# Patient Record
Sex: MALE | Race: WHITE
[De-identification: names, ages, dates, MRNs, and addresses within clinical notes are randomized per-mention and may not be internally consistent; named-entity substitution may affect disease eponyms.]

---

## 2019-06-07 ENCOUNTER — HOSPITAL ENCOUNTER (OUTPATIENT)
Dept: HOSPITAL 39 - LAB.O | Age: 64
End: 2019-06-07
Attending: NURSE PRACTITIONER
Payer: COMMERCIAL

## 2019-06-07 DIAGNOSIS — L03.119: Primary | ICD-10-CM

## 2019-06-11 ENCOUNTER — HOSPITAL ENCOUNTER (OUTPATIENT)
Dept: HOSPITAL 39 - LAB.O | Age: 64
End: 2019-06-11
Attending: SURGERY
Payer: COMMERCIAL

## 2019-06-11 DIAGNOSIS — L02.415: Primary | ICD-10-CM

## 2020-04-12 ENCOUNTER — HOSPITAL ENCOUNTER (OUTPATIENT)
Dept: HOSPITAL 39 - GOCC | Age: 65
End: 2020-04-12
Attending: INTERNAL MEDICINE
Payer: SELF-PAY

## 2020-04-12 DIAGNOSIS — J96.90: ICD-10-CM

## 2020-04-12 DIAGNOSIS — E11.8: ICD-10-CM

## 2020-04-12 DIAGNOSIS — Z74.9: ICD-10-CM

## 2020-04-12 DIAGNOSIS — I63.9: Primary | ICD-10-CM

## 2020-04-12 DIAGNOSIS — E46: ICD-10-CM

## 2020-04-17 ENCOUNTER — HOSPITAL ENCOUNTER (INPATIENT)
Dept: HOSPITAL 39 - ER | Age: 65
LOS: 3 days | Discharge: NURSING, REHAB OR LONG-TERM CARE | DRG: 871 | End: 2020-04-20
Attending: NURSE PRACTITIONER | Admitting: NURSE PRACTITIONER
Payer: SELF-PAY

## 2020-04-17 DIAGNOSIS — L89.153: ICD-10-CM

## 2020-04-17 DIAGNOSIS — R41.82: ICD-10-CM

## 2020-04-17 DIAGNOSIS — J18.9: ICD-10-CM

## 2020-04-17 DIAGNOSIS — I69.998: ICD-10-CM

## 2020-04-17 DIAGNOSIS — E11.649: ICD-10-CM

## 2020-04-17 DIAGNOSIS — G40.909: ICD-10-CM

## 2020-04-17 DIAGNOSIS — J02.0: ICD-10-CM

## 2020-04-17 DIAGNOSIS — Z79.4: ICD-10-CM

## 2020-04-17 DIAGNOSIS — K59.09: ICD-10-CM

## 2020-04-17 DIAGNOSIS — A41.89: Primary | ICD-10-CM

## 2020-04-17 DIAGNOSIS — Z74.01: ICD-10-CM

## 2020-04-17 DIAGNOSIS — Z79.82: ICD-10-CM

## 2020-04-17 DIAGNOSIS — Z20.828: ICD-10-CM

## 2020-04-17 DIAGNOSIS — Z96.0: ICD-10-CM

## 2020-04-17 DIAGNOSIS — Z88.6: ICD-10-CM

## 2020-04-17 DIAGNOSIS — I10: ICD-10-CM
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
## 2020-04-17 DIAGNOSIS — Z93.1: ICD-10-CM

## 2020-04-17 DIAGNOSIS — M62.82: ICD-10-CM

## 2020-04-17 DIAGNOSIS — Z79.899: ICD-10-CM

## 2020-04-17 PROCEDURE — BW211ZZ COMPUTERIZED TOMOGRAPHY (CT SCAN) OF ABDOMEN AND PELVIS USING LOW OSMOLAR CONTRAST: ICD-10-PCS

## 2020-04-17 RX ADMIN — MAGNESIUM HYDROXIDE SCH ML: 400 SUSPENSION ORAL at 20:56

## 2020-04-17 RX ADMIN — ENOXAPARIN SODIUM SCH MG: 40 INJECTION, SOLUTION INTRAVENOUS; SUBCUTANEOUS at 20:56

## 2020-04-17 RX ADMIN — MELATONIN TAB 3 MG SCH MG: 3 TAB at 20:57

## 2020-04-17 RX ADMIN — MAGNESIUM HYDROXIDE SCH ML: 400 SUSPENSION ORAL at 23:07

## 2020-04-17 RX ADMIN — Medication SCH ML: at 21:00

## 2020-04-17 RX ADMIN — INSULIN DETEMIR SCH UNITS: 100 INJECTION, SOLUTION SUBCUTANEOUS at 20:57

## 2020-04-17 RX ADMIN — PIPERACILLIN SODIUM AND TAZOBACTAM SODIUM SCH MLS/HR: 3; .375 INJECTION, POWDER, LYOPHILIZED, FOR SOLUTION INTRAVENOUS at 23:07

## 2020-04-17 RX ADMIN — ALBUTEROL SULFATE SCH PUFF: 90 AEROSOL, METERED RESPIRATORY (INHALATION) at 20:54

## 2020-04-17 RX ADMIN — INSULIN LISPRO SCH: 100 INJECTION, SOLUTION INTRAVENOUS; SUBCUTANEOUS at 21:41

## 2020-04-17 NOTE — CT
EXAM DESCRIPTION:  CT ABDOMEN AND PELVIS WITH CONTRAST



CLINICAL HISTORY: leukocytosis, alk phos elevation, AMS



COMPARISON: None Available.



TECHNIQUE: CT of the abdomen and pelvis are performed after IV

contrast administration. No oral contrast was given. Multiplanar

reconstructions were obtained.



FINDINGS: 



Pleural and subpleural airspace consolidation is present within

the left lower lobe and to a lesser extent within the lingula and

right lower lobe consistent with pneumonia. Imaging features are

atypical or uncommonly reported for (COVID-19 or viral)

pneumonia. There is lack of groundglass opacities. [PneAty]. 



The liver is normal in contour and enhancement. The gallbladder

is unremarkable without calcified gallstone. No biliary ductal

dilatation.



The adrenals and kidneys enhance normally. No hydronephrosis or

nephrolithiasis. A percutaneous gastrostomy tube terminates

within the gastric body.

The spleen is normal in size with scattered splenic

calcifications. Scattered pancreas parenchymal calcifications.



Moderate constipation with stool noted throughout the cecum,

colon, and rectum. There is fecal expansion of the rectum

(measuring 9 cm in diameter). No mechanical bowel obstruction.

The appendix is not clearly visualized. 



There is no lymphadenopathy, inflammation, or free fluid

observed. No free air. Mild intrapelvic atherosclerosis.



A Madden catheter decompresses bladder. Small prostatic

calcification.



No acute osseous abnormality. Transitional lumbosacral anatomy.





IMPRESSION: 



1.  Left lung base pleural and subpleural airspace infiltrates

concerning for pneumonia. More mild infiltrates within the

lingula and right lung base.

2.  Constipation. Large fecal load within the rectum to be seen

with fecal impaction.

3.  No intra-abdominal or pelvic organized fluid collection or

abscess.





This exam was performed according to our departmental

dose-optimization program, which includes automated exposure

control, adjustment of the mA and/or kV according to patient size

and/or use of iterative reconstruction technique.



Electronically signed by:  Juventino Cao DO  4/17/2020 2:40 PM CDT

Workstation: [id number]

## 2020-04-17 NOTE — ED.PDOC
History of Present Illness





- General


Time Seen by Provider: 04/17/20 12:52


Source: EMS, nursing home records





- History of Present Illness


Initial Comments: 





66 yo male with PMH of stroke, hx of trach dependency s/p reversal, g-tube 

dependent, chronic indwelling forbes who is bib EMS from [facility] for cc of

possible seizure activity.  Per EMS no seizure activity was reported on scene, 

just were told that the patient had an elevated heart rate and they wanted him 

evaluated.  Pt noted to be tachycardic en route HR 120s but afebrile, remainder 

of vitals stable.  Reported he is otherwise at his baseline mental status.  Pt 

at baseline seems to be awake and alert, minimal verbal communication.  Bed-

bound, fully dependent on others for all ADL's.  He denies any complaints to me 

and denies any pain, dyspnea, or other discomforts currently.





Allergies/Adverse Reactions: 


Allergies





Meperidine [From Demerol HCl] Allergy (Verified 04/17/20 13:39)


   





Home Medications: 


Ambulatory Orders





Ascorbic Acid [Vitamin C] 250 mg PO DAILY 04/17/20 


Aspirin [(None)] 325 mg PO DAILY 04/17/20 


Cholecalciferol [Vitamin D-3] 5,000 unit PO DAILY 04/17/20 


Divalproex Sodium [Depakote] 250 mg PO BID 04/17/20 


Famotidine [Pepcid Tab] 20 mg PO BID 04/17/20 


Folic Acid [FA-8] 0.8 mg PO DAILY 04/17/20 


Insulin Glargine [Lantus Solostar] 20 unit SC BID 04/17/20 


Metformin HCl [Fortamet] 500 mg PO BID 04/17/20 


Quetiapine Fumarate [Seroquel] 75 mg PO BID 04/17/20 


RX: Clonazepam 0.5 mg PO BID 04/17/20 


RX: Melatonin 3 mg PO BEDTIME 04/17/20 


RX: Tramadol HCl 50 mg PO Q8H PRN 04/17/20 


RX: Zinc Sulfate 220 mg PO DAILY 04/17/20 


Silodosin [Rapaflo] 8 mg PO DAILY 04/17/20 











Review of Systems





- Review of Systems


Review of Systems: 





04/17/20 13:09


as per HPI





All other Systems: Reviewed and Negative





Past Medical History (General)





- Patient Medical History


Hx Stroke: Yes


Hx Hypertension: Yes


Hx Diabetes: Yes





- Vaccination History


Hx Tetanus, Diphtheria Vaccination: Yes - 2013





Family Medical History





- Family History


  ** Mother


Family History: Unknown





Physical Exam





- Physical Exam


General Appearance: Alert, Comfortable, No apparent distress, Unkempt


Eye Exam: bilateral normal


Ears, Nose, Throat: normal ENT inspection, normal pharynx


Neck: non-tender, full range of motion, supple, normal inspection


Respiratory: chest non-tender, no respiratory distress, no accessory muscle use,

 rales - wet rales BL at lower and mid lung fields, rhonchi


Cardiovascular/Chest: no edema, no gallop, no JVD, no murmur, tachycardia


Peripheral Pulses: radial,right: 2+, radial,left: 2+


Gastrointestinal/Abdominal: non tender, soft, no organomegaly


Back Exam: normal inspection, no CVA tenderness, no vertebral tenderness


Extremity: non-tender, normal inspection, no pedal edema, no calf tenderness


Neurologic: alert, other - generally weak, strength appears 3/5 throughout all 

extremities but difficult to test given baseline mental status, able to answer 

simple yes/no questions and follow simple commands


Skin Exam: warm/dry, pallor





Progress





- Progress


Progress: 





04/17/20 13:11


AMS


-consider: infection/sepsis, ?seizure, ?CVA, CHF, PNA, ACS, other, metabolic 

derangement, other


-obtain cardiac work-up, CT head, sepsis work-up


-place PIV, 1 L NS bolus





04/17/20 16:37


-CT head showed no acute processes


-Labs revealed WBC 15,000 w/ left shift & no bands, lactate 2.3 -> 2.1 

(improving with IV fluids).  Abnormal LFT's and alk phos so CT A/P obtained 

which revealed no acute intraabdominal processes but a left lower lobe lung 

infiltrate noted concerning for PNA.  Pt also noted to be strep positive.  CPK 

>900 -> Rhabdo.


-Spoke with pt's wife at bedside who states pt did have a seizure several years 

ago while in the hospital after his stroke and is on Depakote.  No further 

events until today - today's event witnessed by nurse at NH but not by wife.  No

 further seizure activity in ED and pt improving - wife reports nearly back to 

baseline mental status.


-Discussed dx's of CAP, strep, ?UTI, sepsis, rhabdo, ?seizure and need for 

admission.  Blood cx's drawn and begun on Zosyn for broad-spectrum coverage.


-Spoke with Judi Adolph who accepts for admission.  After some discussion, will 

also send COVID testing given high-risk pt.  He has not had a fever today.  

However, initial O2 sat here was 88% on room air which improved to >95% with 2 L

 by NC.





Keyon Solis MD


Billing #[id number]








                                        





04/17/20 12:52


Sodium Chloride 0.9% (Flush) [Saline Flush Syringe]   10 ml IV PRN PRN 


Pulse Oximetry Assessment DAILY 





04/17/20 12:53


URINALYSIS Stat 





04/17/20 13:00


EKG STAT 





04/17/20 13:49


Hold Metformin x 48Hrs [id number] 





04/17/20 14:20


BLOOD CULTURE Stat 





04/17/20 15:46


Sodium Chloride 0.9% 1000ML [Ns 1000 ml] 1,000 ml IVS ONCE 





04/17/20 16:23


SARS-COV2 PCR HIGH RISK [facility] Stat 





04/18/20 09:00


Pulse Ox Daily 








                         Laboratory Results - last 24 hr











  04/17/20 04/17/20 04/17/20





  13:03 13:18 13:18


 


WBC   15.7 H 


 


RBC   3.46 L 


 


Hgb   9.9 L 


 


Hct   29.8 L 


 


MCV   86.0 


 


MCH   28.5 


 


MCHC   33.2 


 


RDW   13.9 


 


Plt Count   399 


 


MPV   7.3 L 


 


Absolute Neuts (auto)   13.40 H 


 


Absolute Lymphs (auto)   1.20 


 


Absolute Monos (auto)   1.00 H 


 


Absolute Eos (auto)   0.00 


 


Absolute Basos (auto)   0.00 


 


Neutrophils %   85.4 H 


 


Lymphocytes %   7.5 L 


 


Monocytes %   6.6 


 


Eosinophils %   0.2 L 


 


Basophils %   0.3 


 


Sodium    136


 


Potassium    4.4


 


Chloride    103


 


Carbon Dioxide    22


 


Anion Gap    15.4


 


BUN    50 H


 


Creatinine    1.03


 


BUN/Creatinine Ratio    48.5 H


 


POC Glucose  165 H  


 


Random Glucose    151 H


 


Serum Osmolality    288.2


 


Lactic Acid   


 


Calcium    8.6


 


Total Bilirubin    0.7


 


AST    76 H


 


ALT    160 H


 


Alkaline Phosphatase    262 H


 


Creatine Kinase   


 


Troponin I   


 


B-Natriuretic Peptide    118.0 H


 


Serum Total Protein    7.1


 


Albumin    2.2 L


 


Globulin    4.9 H


 


Albumin/Globulin Ratio    0.4 L


 


Group A Strep Rapid   














  04/17/20 04/17/20 04/17/20





  13:18 13:18 13:18


 


WBC   


 


RBC   


 


Hgb   


 


Hct   


 


MCV   


 


MCH   


 


MCHC   


 


RDW   


 


Plt Count   


 


MPV   


 


Absolute Neuts (auto)   


 


Absolute Lymphs (auto)   


 


Absolute Monos (auto)   


 


Absolute Eos (auto)   


 


Absolute Basos (auto)   


 


Neutrophils %   


 


Lymphocytes %   


 


Monocytes %   


 


Eosinophils %   


 


Basophils %   


 


Sodium   


 


Potassium   


 


Chloride   


 


Carbon Dioxide   


 


Anion Gap   


 


BUN   


 


Creatinine   


 


BUN/Creatinine Ratio   


 


POC Glucose   


 


Random Glucose   


 


Serum Osmolality   


 


Lactic Acid  2.3 H  


 


Calcium   


 


Total Bilirubin   


 


AST   


 


ALT   


 


Alkaline Phosphatase   


 


Creatine Kinase    915 H*


 


Troponin I   0.03 


 


B-Natriuretic Peptide   


 


Serum Total Protein   


 


Albumin   


 


Globulin   


 


Albumin/Globulin Ratio   


 


Group A Strep Rapid   














  04/17/20 04/17/20





  13:40 14:05


 


WBC  


 


RBC  


 


Hgb  


 


Hct  


 


MCV  


 


MCH  


 


MCHC  


 


RDW  


 


Plt Count  


 


MPV  


 


Absolute Neuts (auto)  


 


Absolute Lymphs (auto)  


 


Absolute Monos (auto)  


 


Absolute Eos (auto)  


 


Absolute Basos (auto)  


 


Neutrophils %  


 


Lymphocytes %  


 


Monocytes %  


 


Eosinophils %  


 


Basophils %  


 


Sodium  


 


Potassium  


 


Chloride  


 


Carbon Dioxide  


 


Anion Gap  


 


BUN  


 


Creatinine  


 


BUN/Creatinine Ratio  


 


POC Glucose  


 


Random Glucose  


 


Serum Osmolality  


 


Lactic Acid   2.1


 


Calcium  


 


Total Bilirubin  


 


AST  


 


ALT  


 


Alkaline Phosphatase  


 


Creatine Kinase  


 


Troponin I  


 


B-Natriuretic Peptide  


 


Serum Total Protein  


 


Albumin  


 


Globulin  


 


Albumin/Globulin Ratio  


 


Group A Strep Rapid  Positive 














- EKG/XRAY/CT


EKG: Sinus - sinus tach, , no ST elevs or q waves, axis and intervals 

normal, shaky baseline given pt's tremors, no prior EKG for comparison


XRAY: chest - no acute processes per my read





Departure





- Departure


Clinical Impression: 


 Community acquired pneumonia, Strep pharyngitis, Sepsis, Seizure, 

Rhabdomyolysis





Time of Disposition: 16:36


Disposition: Admit Patient


Condition: Fair


Home Medications: 


Ambulatory Orders





Ascorbic Acid [Vitamin C] 250 mg PO DAILY 04/17/20 


Aspirin [(None)] 325 mg PO DAILY 04/17/20 


Cholecalciferol [Vitamin D-3] 5,000 unit PO DAILY 04/17/20 


Divalproex Sodium [Depakote] 250 mg PO BID 04/17/20 


Famotidine [Pepcid Tab] 20 mg PO BID 04/17/20 


Folic Acid [FA-8] 0.8 mg PO DAILY 04/17/20 


Insulin Glargine [Lantus Solostar] 20 unit SC BID 04/17/20 


Metformin HCl [Fortamet] 500 mg PO BID 04/17/20 


Quetiapine Fumarate [Seroquel] 75 mg PO BID 04/17/20 


RX: Clonazepam 0.5 mg PO BID 04/17/20 


RX: Melatonin 3 mg PO BEDTIME 04/17/20 


RX: Tramadol HCl 50 mg PO Q8H PRN 04/17/20 


RX: Zinc Sulfate 220 mg PO DAILY 04/17/20 


Silodosin [Rapaflo] 8 mg PO DAILY 04/17/20 











Decision To Admit





- Decistion To Admit


Decision to Admit Reason: Admit from ER


Decision to Admit Date: 04/17/20


Decision to Admit Time: 16:37

## 2020-04-17 NOTE — RAD
EXAM DESCRIPTION:

Chest,1 View



CLINICAL HISTORY:

65 years Male, tachycardia, altered mental status



COMPARISON:

None.



TECHNIQUE:

AP portable chest.



FINDINGS:

Fair expansion of the lungs is evident without consolidation,

layering effusion, or large mass. Modest eventration of the right

hemidiaphragm and elevation of the lung base is noted without

significant inflammatory changes.

Heart size and vascularity appear normal for AP technique and

degree of inspiration.

No gross bony, hilar, or mediastinal abnormalities are noted. 



IMPRESSION:

Normal chest, one view



Electronically signed by:  Brad Cummins MD  4/17/2020 1:14 PM

CDT Workstation: [id number]

## 2020-04-17 NOTE — CT
EXAM DESCRIPTION: Head



CLINICAL HISTORY: altered mental status, hx of stroke



COMPARISON: None available



TECHNIQUE: Non contrast cranial CT with multiplanar

reconstructions.



FINDINGS: 



No acute intracranial hemorrhage, transcortical infarct, mass or

mass effect. No intra or extra-axial fluid collection. No focal

edema or midline shift. There is moderate mineralization within

the bilateral basal ganglia and bilateral cerebellar white

matter/dentate nuclei, nonspecific. Mild central cerebral volume

loss. No hydrocephalus.   



Scattered chronic appearing infarcts within the bilateral centrum

semiovale and corona radiata in addition to chronic infarct/focal

encephalomalacia within the left inferior temporal lobe. Mild

periventricular and deep white matter hypodensities are likely

related to chronic microvascular ischemic changes. 



No displaced calvarial fracture. The visualized paranasal sinuses

and the mastoids are clear. Chronic sclerosis within the

posterior aspect of the right mastoid air cells.





IMPRESSION:



1.  No acute intracranial hemorrhage or transcortical infarct.

MRI is more sensitive for the evaluation of acute/subacute

intracranial ischemia and can be obtained if clinically

warranted.

2.  Scattered chronic infarcts within the bilateral deep white

matter and left temporal lobe.

3.  Nonspecific mineralization within the bilateral basal and

cerebellar dentate nuclei.







This exam was performed according to our departmental

dose-optimization program, which includes automated exposure

control, adjustment of the mA and/or kV according to patient size

and/or use of iterative reconstruction technique.



Electronically signed by:  Juventino Cao DO  4/17/2020 2:04 PM CDT

Workstation: [id number]

## 2020-04-17 NOTE — HP
SUPERVISING PHYSICIAN:   Jeffery Sharma MD



CHIEF COMPLAINT:   Questionable seizure activity and altered mental status.



HISTORY OF PRESENT ILLNESS:   This is a 65 year-old male patient with a past 
medical history of stroke with a history of a trach that was reversed with a G-
tube and a chronic indwelling Madden catheter.  He was brought to the Emergency 
Room from [facility] for complaints of possible seizure activity. 
EMS did not report any seizure activity on the scene but they were just told 
that the patient had an elevated heart rate and there was question of altered 
mental status by his wife as well as they were unsure if he had a seizure.  It 
is to be noted he does have a past history of some seizure disorder and 
presently on Depakote.  He was afebrile but his heart rate was in the 120s.  The
remainder of his vital signs were stable.  His wife did report that he seemed to
have a change in his mental status but by the time he got to the Emergency Room 
he was at his baseline.  He is nonverbal and does not follow commands.  He is 
bedbound and fully dependent on others for  ADLs.  His vital signs in the 
Emergency Room were temperature of 97.5, heart rate 126, blood pressure 124/83, 
respiratory rate 22 to 26, it got up as high as the 30s.  His oxygen saturation 
was 88% on room air and came up to the low 90s on oxygen.  Lab studies were 
done.  His WBCs were 15,700 with a hemoglobin of 9.9, hematocrit 29.8.  He had a
left shift on his differential.  Blood glucose was 165 with electrolytes within 
normal limits.  BUN 50, creatinine 1.05, lactic acid 2.3.  He received some 
fluids.  His AST is 76, , alkaline phosphatase 262.  Creatinine kinase 
915, troponin 0.03.  .  Group A rapid strep was positive.  Blood cultures
were drawn.  Influenza per PCR negative for A and B.  Chest x-ray showed normal 
chest, one-view.  Head CT showed (1) no acute intracranial hemorrhage or 
transcortical infarction.  MRI is more sensitive to the evaluation.  (2) 
Scattered chronic infarcts within the bilateral deep white matter and left 
temporal lobe.  (3)  Nonspecific mineralization within the bilateral basilar and
cerebellar dentate nuclei.   Abdomen and pelvis CT showed: (1) Left lung-based 
pleural and subpleural air-space infiltrates concerning for pneumonia or mild 
infiltrates within the lingula and right lung base.  (2) Constipation, large 
fecal load within the rectum seen with fecal impaction.  (3)  No intraabdominal 
or pelvic organized fluid collection or abscess.  He was given Zosyn in the 
Emergency Room as well as several liters of fluids.  His followup lactic acid 
was 2.1.  I was called for admission for sepsis related to bilateral pneumonia.



PAST MEDICAL HISTORY: 

1.  CVA with residual G-tube, chronic indwelling Madden catheter and 

     tracheostomy that has since been reversed.

2.  Diabetes mellitus.

3.  Hypertension.



PAST SURGICAL HISTORY:

1.  Toe amputation.



CURRENT MEDICATIONS:

1.  Ascorbic acid.

2.  Aspirin.

3.  Vitamin D3.

4.  Pepcid.

5.  Zinc sulfate.

6.  Clonazepam.

7.  Depakote.

8.  Folic acid.

9.  Glargine insulin.

10.  Melatonin.

11.  Metformin.

12.  Seroquel.

13.  Rapaflo.

14.  Tramadol.

.

ALLERGIES:    Demerol.



FAMILY HISTORY:   Unknown.



SOCIAL HISTORY:  There is no history of tobacco, ETOH or illicit drug use.  He 
lives in [facility].  He had a seizure for unknown length of time 
in the past.



REVIEW OF SYSTEMS: 

Unable to obtain due to patient's current cognitive status.



PHYSICAL EXAMINATION: 



VITAL SIGNS:   Temperature 97, heart rate 110, blood pressure 112/76, 
respiratory rate 20, oxygen saturation 91% on 3 liters nasal cannula.



GENERAL:   This is a 65 year-old male patient lying in his hospital bed.  He is 
in no acute distress.



HEENT:   Normocephalic and atraumatic.  Pupils are equal and reactive.  
Oropharynx is clear.



NECK:   Supple without mass.  There is an old tracheostomy scar on his neck.



CHEST:   Diminished throughout.  Chest shows equal rise and fall of the chest 
with inspiration and expiration.



CARDIOVASCULAR:   He has a sinus tachycardia on the cardiac monitor.



ABDOMEN:  Soft, nondistended, non-tender.  Bowel sounds are positive. 



EXTREMITIES:   No cyanosis, clubbing, or edema.



NEUROLOGIC:   He is asleep.  He awakens easily, opens his eyes but he does not 
have any meaningful conversation and does not follow commands at this time.



SKIN:  Warm and dry.



LABORATORY:  Labs and films are as per the history of present illness.



ASSESSMENT: 

!.   Sepsis related to bilateral pneumonia, most likely healthcare associated

      as patient lives in a long-term care facility.  He has an admitting WBC of

     15, 700 with a heart rate of 126, respiratory rate of 22 to 30, oxygen

      saturation 88% on room air. 

2.  Strep positive phalangitis.

3.   High risk for COVID-19 due to comorbidities, his age and being a 

      resident of a long-term care facility.

4.  Altered mental status with return to baseline in the Emergency Room.

5.  Questionable seizures with a history of seizure disorder, presently

     on Depakote.

6.  Diabetes mellitus type 2.

7.  History of CVA with residual long-term effects that have included a

     reverse tracheostomy, a G-tube and an indwelling Madden catheter  

     totally dependent for ADLS. 

8.  Constipation with fecal impaction.



PLAN:   The patient has been admitted to the hospital.  I will continue his 
Zosyn as ordered in the Emergency Room and I have added vancomycin per pharmacy 
protocol.  The pneumonia guidelines have been initiated that include aggressive 
pulmonary hygiene that includes p.r.n. and scheduled albuterol inhaler.  I will 
given him a dose of milk of magnesia tonight and he may need an enema or a GI 
consultation tomorrow.  I have also done a Depakote level and once his tube 
feedings have been initiated, we will order a sliding scale insulin based on his
feedings.  His home medications have been restarted.  Neuro checks have been 
ordered.  A COVID-19 test has been sent off and we will await those results as 
well as follow his cultures as they become available.  Labs and films have been 
ordered for in the morning and we will continue to monitor him closely and 
follow as needed.



#[id number]

[facility]

## 2020-04-18 RX ADMIN — ALBUTEROL SULFATE SCH: 90 AEROSOL, METERED RESPIRATORY (INHALATION) at 08:00

## 2020-04-18 RX ADMIN — INSULIN LISPRO SCH: 100 INJECTION, SOLUTION INTRAVENOUS; SUBCUTANEOUS at 17:30

## 2020-04-18 RX ADMIN — SODIUM CHLORIDE SCH MLS/HR: 9 INJECTION, SOLUTION INTRAVENOUS at 19:54

## 2020-04-18 RX ADMIN — PIPERACILLIN SODIUM AND TAZOBACTAM SODIUM SCH MLS/HR: 3; .375 INJECTION, POWDER, LYOPHILIZED, FOR SOLUTION INTRAVENOUS at 04:30

## 2020-04-18 RX ADMIN — INSULIN LISPRO SCH: 100 INJECTION, SOLUTION INTRAVENOUS; SUBCUTANEOUS at 13:03

## 2020-04-18 RX ADMIN — VANCOMYCIN HYDROCHLORIDE SCH MLS/HR: 500 INJECTION, POWDER, LYOPHILIZED, FOR SOLUTION INTRAVENOUS at 08:44

## 2020-04-18 RX ADMIN — MELATONIN TAB 3 MG SCH MG: 3 TAB at 20:08

## 2020-04-18 RX ADMIN — ENOXAPARIN SODIUM SCH MG: 40 INJECTION, SOLUTION INTRAVENOUS; SUBCUTANEOUS at 20:07

## 2020-04-18 RX ADMIN — DIVALPROEX SODIUM SCH MG: 250 TABLET, DELAYED RELEASE ORAL at 08:44

## 2020-04-18 RX ADMIN — INSULIN LISPRO SCH: 100 INJECTION, SOLUTION INTRAVENOUS; SUBCUTANEOUS at 21:17

## 2020-04-18 RX ADMIN — ALBUTEROL SULFATE SCH PUFF: 90 AEROSOL, METERED RESPIRATORY (INHALATION) at 16:48

## 2020-04-18 RX ADMIN — PANTOPRAZOLE SODIUM SCH MG: 40 INJECTION, POWDER, FOR SOLUTION INTRAVENOUS at 06:44

## 2020-04-18 RX ADMIN — INSULIN LISPRO SCH: 100 INJECTION, SOLUTION INTRAVENOUS; SUBCUTANEOUS at 06:44

## 2020-04-18 RX ADMIN — ALBUTEROL SULFATE SCH PUFF: 90 AEROSOL, METERED RESPIRATORY (INHALATION) at 11:45

## 2020-04-18 RX ADMIN — INSULIN DETEMIR SCH UNITS: 100 INJECTION, SOLUTION SUBCUTANEOUS at 21:18

## 2020-04-18 RX ADMIN — DIVALPROEX SODIUM SCH MG: 250 TABLET, DELAYED RELEASE ORAL at 20:08

## 2020-04-18 RX ADMIN — INSULIN DETEMIR SCH: 100 INJECTION, SOLUTION SUBCUTANEOUS at 08:45

## 2020-04-18 RX ADMIN — FOLIC ACID SCH MG: 1 TABLET ORAL at 08:44

## 2020-04-18 RX ADMIN — Medication SCH ML: at 08:44

## 2020-04-18 RX ADMIN — SODIUM CHLORIDE SCH MLS/HR: 9 INJECTION, SOLUTION INTRAVENOUS at 13:03

## 2020-04-18 RX ADMIN — Medication SCH ML: at 20:08

## 2020-04-18 RX ADMIN — LEVOFLOXACIN SCH MLS/HR: 5 INJECTION, SOLUTION INTRAVENOUS at 13:08

## 2020-04-18 RX ADMIN — VANCOMYCIN HYDROCHLORIDE SCH MLS/HR: 500 INJECTION, POWDER, LYOPHILIZED, FOR SOLUTION INTRAVENOUS at 21:17

## 2020-04-18 RX ADMIN — ALBUTEROL SULFATE SCH PUFF: 90 AEROSOL, METERED RESPIRATORY (INHALATION) at 20:07

## 2020-04-18 NOTE — RAD
: 1955.



Technique: Two portable AP chest x-ray. Comparison: 2020.



Clinical history: Pneumonia.



Heart size: Heart size within normal limits.

Lungs: No new airspace consolidations are seen. Mild chronic

appearing background interstitial opacities are noted.

Pleura: No pleural effusion. No pneumothorax.

Mediastinum and richar: Unremarkable.

Skeletal: Unremarkable.

Support tubings: None.



Impression:

1.  No acute findings in the chest.



Electronically signed by:  Sourav Darby MD  2020 9:46 AM

CDT Workstation: [id number]

## 2020-04-18 NOTE — PN
SUPERVISING PHYSICIAN:    Jeffery Sharma MD



DATE:   04/18/20



SUBJECTIVE: The patient is resting in bed.  He has had a low grade fever of 
100.0 overnight, remains on nasal cannula is showing to be stable.



OBJECTIVE:

VITAL SIGNS:  T-max 100.0 with pulse 96, blood pressure 138/72, respirations 18,
oxygen saturation 97% on 3 liter nasal cannula.

GENERAL:  The patient is resting comfortably, does not appear to be in any 
distress.  He is alert to verbal commands but hall not converse.

CHEST:  Lung sounds remain diminished throughout.

HEART:  Continues to show tachycardia on bedside monitor.

ABDOMEN:  Soft, non-tender, bowel sounds are positive.

EXTREMITIES:  Without edema.

NEUROLOGIC:  He awakens easily but does not have any meaningful conversation but
will follow some basic commands.  He does not show to have any obvious focal 
deficits.

SKIN:  Warm and dry.



LABORATORY:  White count 12,800, hemoglobin down to 8.2, hematocrit 24.0, 
differential does continue to show a left shift.  Chemistries show normal 
electrolytes with BUN 42, creatinine 0.9, calcium 8.2.  Liver functions show 
elevation but improvement, AST down to 50, ALT down to 115.  Alkaline 
phosphatase 209.  Blood sugars remain stable between 69 and 143.



MICROBIOLOGY:  Urine culture pending.  Blood cultures negative at 24 hours.  
Repeat chest x-ray this morning per radiology interpretation shows no air-space 
consolidation with some mild chronic appearing interstitial opacities noted but 
no significant change from previous findings per radiology interpretation.



ASSESSMENT: 

1.   Sepsis due to bilateral pneumonia, healthcare associated with patient being


      a resident of a long-term care facility.

2.  Group A strep pharyngitis.

3.   High risk for COVID-19 with risk factors for comorbidities and 

      resident of a long-term care facility, pending results.

4.  Questionable seizures with a history of seizure disorder, presently

     on Depakote.

5.  Diabetes mellitus type 2 showing to be stable.

6.  History of CVA with long-term effects including need for G-tube

     reverse tracheostomy, with totally dependent for ADLS. 

7.  Decubitus on coccyx, chronic, stage 2 to 3.

8.  Chronic constipation with some fecal impaction.



PLAN:  Will continue with current plan of care but will monitor patient with 
antibiotics.  Change him off Zosyn, vancomycin combination to include Cefepime, 
vancomycin and Levaquin, just given his age, disabilities and continued need for
coverage for hospital healthcare associated pneumonia.  Right now, his renal 
function is showing to be stable.  Will continue to monitor that as needed.  His
COVID-19 is still pending.  Will continue to work on the constipation with de-
impaction with digital exam and Dulcolax suppositories.  Once he is showing to 
have good bowel movements, will start him on some Miralax and milk of magnesia 
as needed.  I anticipate another 24-48 hours of more aggressive management 
before we can transition him back to outpatient management.  Until then, we will
continue to monitor and treat as needed. 



#[id number]

[facility]

## 2020-04-19 RX ADMIN — INSULIN LISPRO SCH: 100 INJECTION, SOLUTION INTRAVENOUS; SUBCUTANEOUS at 16:32

## 2020-04-19 RX ADMIN — INSULIN DETEMIR SCH UNITS: 100 INJECTION, SOLUTION SUBCUTANEOUS at 09:12

## 2020-04-19 RX ADMIN — SODIUM CHLORIDE SCH MLS/HR: 9 INJECTION, SOLUTION INTRAVENOUS at 12:27

## 2020-04-19 RX ADMIN — DIVALPROEX SODIUM SCH MG: 250 TABLET, DELAYED RELEASE ORAL at 20:28

## 2020-04-19 RX ADMIN — ALBUTEROL SULFATE SCH PUFF: 90 AEROSOL, METERED RESPIRATORY (INHALATION) at 16:00

## 2020-04-19 RX ADMIN — LEVOFLOXACIN SCH MLS/HR: 5 INJECTION, SOLUTION INTRAVENOUS at 13:38

## 2020-04-19 RX ADMIN — INSULIN LISPRO SCH: 100 INJECTION, SOLUTION INTRAVENOUS; SUBCUTANEOUS at 06:43

## 2020-04-19 RX ADMIN — SODIUM CHLORIDE SCH MLS/HR: 9 INJECTION, SOLUTION INTRAVENOUS at 20:00

## 2020-04-19 RX ADMIN — Medication SCH ML: at 09:07

## 2020-04-19 RX ADMIN — VANCOMYCIN HYDROCHLORIDE SCH MLS/HR: 500 INJECTION, POWDER, LYOPHILIZED, FOR SOLUTION INTRAVENOUS at 09:19

## 2020-04-19 RX ADMIN — ALBUTEROL SULFATE SCH PUFF: 90 AEROSOL, METERED RESPIRATORY (INHALATION) at 20:00

## 2020-04-19 RX ADMIN — Medication PRN ML: at 05:41

## 2020-04-19 RX ADMIN — FOLIC ACID SCH MG: 1 TABLET ORAL at 09:07

## 2020-04-19 RX ADMIN — DIVALPROEX SODIUM SCH MG: 250 TABLET, DELAYED RELEASE ORAL at 09:07

## 2020-04-19 RX ADMIN — MELATONIN TAB 3 MG SCH MG: 3 TAB at 20:28

## 2020-04-19 RX ADMIN — Medication SCH ML: at 20:29

## 2020-04-19 RX ADMIN — SODIUM CHLORIDE SCH MLS/HR: 9 INJECTION, SOLUTION INTRAVENOUS at 03:27

## 2020-04-19 RX ADMIN — PANTOPRAZOLE SODIUM SCH MG: 40 INJECTION, POWDER, FOR SOLUTION INTRAVENOUS at 05:41

## 2020-04-19 RX ADMIN — Medication PRN ML: at 21:06

## 2020-04-19 RX ADMIN — ALBUTEROL SULFATE SCH PUFF: 90 AEROSOL, METERED RESPIRATORY (INHALATION) at 12:00

## 2020-04-19 RX ADMIN — INSULIN LISPRO SCH: 100 INJECTION, SOLUTION INTRAVENOUS; SUBCUTANEOUS at 12:23

## 2020-04-19 RX ADMIN — VANCOMYCIN HYDROCHLORIDE SCH MLS/HR: 500 INJECTION, POWDER, LYOPHILIZED, FOR SOLUTION INTRAVENOUS at 21:07

## 2020-04-19 RX ADMIN — VANCOMYCIN HYDROCHLORIDE SCH: 500 INJECTION, POWDER, LYOPHILIZED, FOR SOLUTION INTRAVENOUS at 09:00

## 2020-04-19 RX ADMIN — INSULIN DETEMIR SCH: 100 INJECTION, SOLUTION SUBCUTANEOUS at 23:16

## 2020-04-19 RX ADMIN — ALBUTEROL SULFATE SCH PUFF: 90 AEROSOL, METERED RESPIRATORY (INHALATION) at 08:55

## 2020-04-19 RX ADMIN — ENOXAPARIN SODIUM SCH MG: 40 INJECTION, SOLUTION INTRAVENOUS; SUBCUTANEOUS at 20:26

## 2020-04-19 RX ADMIN — INSULIN LISPRO SCH: 100 INJECTION, SOLUTION INTRAVENOUS; SUBCUTANEOUS at 21:35

## 2020-04-19 NOTE — PN
SUPERVISING PHYSICIAN:    Jeffery Sharma MD



DATE:  04/19/20



SUBJECTIVE: The patient is having some low blood sugars, still runs a low grade 
fever at night.  He is much more active today during nursing care, actually 
trying to fight some of the nurses.



OBJECTIVE:

VITAL SIGNS:  Temperature 93.3, pulse 108, blood pressure 131/79, respirations 
16, oxygen saturation 96% on 3 liter nasal cannula.  I&O: positive balance of 
779 with multiple bowel movements.  Weight 77.0 kg.

GENERAL:  The patient is resting comfortably, does not appear to be in any acute
distress.  He does track during exam, interacts much more than yesterday but 
will not converse.

CHEST:  Lung sounds diminished throughout with some faint rhonchi noted in the 
right upper lobe more posterior.

HEART:  Regular rate and rhythm.  Continues to show some tachycardia on bedside 
monitor.

ABDOMEN:  Soft, non-tender, bowel sounds are positive with G-tube in place

EXTREMITIES:  Without edema.

NEUROLOGIC:  He is alert but does not converse.  He does not show any obvious 
focal deficits.

SKIN:  Warm and dry.



LABORATORY:  White count now has normalized at 8,400, hemoglobin stable at 8.3, 
hematocrit stable at 24.5.  Platelet count 301,000, differential does continue 
to show a left shift.  Chemistries show normal electrolytes with BUN 30, 
creatinine 0.67.  Blood sugars remain between 57 and 175. .



RADIOLOGY:  No additional radiographic studies today.



ASSESSMENT: 

1.   Sepsis due to bilateral pneumonia, healthcare associated with patient being


      a resident of a long-term care facility.

2.  Group A strep pharyngitis.

3.   High risk for COVID-19 with risk factors for comorbidities and 

      resident of a long-term care facility, pending results.

4.  Questionable seizures with a history of seizure disorder, presently

     on Depakote.

5.  Diabetes mellitus type 2 showing to be stable.

6.  History of CVA with long-term effects including need for G-tube

     reverse tracheostomy, with totally dependent for ADLS. 

7.  Decubitus on coccyx, chronic, stage 2 to 3.

8.  Chronic constipation with some fecal impaction.



PLAN:  Will continue with Cefepime, vancomycin and Levaquin as his white count 
has normalized.  I still believe we are treating coverage on hospital healthcare
associated pneumonia.  Liver functions show to be stable.  Hemoglobin and 
hematocrit showing to be stable.  He continues to have bowel movements.  I 
anticipate we could probably discharge tomorrow if he continues to show clinical
stabilization.  Until then, we will continue to monitor and treat as needed. 



#[id number]

[facility]

## 2020-04-20 VITALS — OXYGEN SATURATION: 98 % | SYSTOLIC BLOOD PRESSURE: 128 MMHG | TEMPERATURE: 99 F | DIASTOLIC BLOOD PRESSURE: 76 MMHG

## 2020-04-20 RX ADMIN — SODIUM CHLORIDE SCH MLS/HR: 9 INJECTION, SOLUTION INTRAVENOUS at 11:46

## 2020-04-20 RX ADMIN — PANTOPRAZOLE SODIUM SCH MG: 40 INJECTION, POWDER, FOR SOLUTION INTRAVENOUS at 06:14

## 2020-04-20 RX ADMIN — SODIUM CHLORIDE SCH MLS/HR: 9 INJECTION, SOLUTION INTRAVENOUS at 03:09

## 2020-04-20 RX ADMIN — DIVALPROEX SODIUM SCH MG: 250 TABLET, DELAYED RELEASE ORAL at 08:15

## 2020-04-20 RX ADMIN — VANCOMYCIN HYDROCHLORIDE SCH MLS/HR: 500 INJECTION, POWDER, LYOPHILIZED, FOR SOLUTION INTRAVENOUS at 08:32

## 2020-04-20 RX ADMIN — ALBUTEROL SULFATE SCH PUFF: 90 AEROSOL, METERED RESPIRATORY (INHALATION) at 13:00

## 2020-04-20 RX ADMIN — INSULIN LISPRO SCH: 100 INJECTION, SOLUTION INTRAVENOUS; SUBCUTANEOUS at 11:45

## 2020-04-20 RX ADMIN — ALBUTEROL SULFATE SCH PUFF: 90 AEROSOL, METERED RESPIRATORY (INHALATION) at 08:32

## 2020-04-20 RX ADMIN — FOLIC ACID SCH MG: 1 TABLET ORAL at 08:15

## 2020-04-20 RX ADMIN — LEVOFLOXACIN SCH MLS/HR: 5 INJECTION, SOLUTION INTRAVENOUS at 12:27

## 2020-04-20 RX ADMIN — INSULIN DETEMIR SCH: 100 INJECTION, SOLUTION SUBCUTANEOUS at 11:23

## 2020-04-20 RX ADMIN — Medication SCH ML: at 08:32

## 2020-04-20 RX ADMIN — INSULIN LISPRO SCH: 100 INJECTION, SOLUTION INTRAVENOUS; SUBCUTANEOUS at 07:10

## 2020-04-20 NOTE — DS
SUPERVISING PHYSICIAN:  Selwyn Gonzáles MD



DISCHARGE DIAGNOSIS: 

1.  Sepsis due to bilateral pneumonia, healthcare associated, with patient being


     a resident of a long-term care facility.

2.  Group A strep pharyngitis.

3.  High risk for COVID-19 with risk factors for comorbidities and resident of a


     long-term care facility.  His COVID-19 results are negative.

4.  Questionable seizures with a history of seizure disorder, presently on 
Depakote.

5.  Diabetes mellitus type 2, stable.

6.  History of cerebrovascular accident with long-term effects including need 
for G-tube

     reverse tracheostomy, with totally dependency for ADLs. 

7.  Decubitus on coccyx, chronic, stage 2 to 3.

8.  Chronic constipation with fecal impaction.



HISTORY OF PRESENT ILLNESS:  This is a 65-year-old male patient with a past 
medical history of stroke with a history of a trach that was reversed.  The 
stroke was in February of 2020.  He also has a G-tube and a chronic indwelling 
Madden catheter.  He came to the Emergency Room from [facility] for
complaints of possible seizure activity.  EMS did not report any seizure 
activity on the scene, but they were just told that the patient had an elevated 
heart rate and there was question of altered mental status by his wife as well 
and they were unsure if he had a seizure.  He does have a past history of some 
seizure disorder and is presently on Depakote.  He was afebrile, but his heart 
rate was in the 120s.  The remainder of his vital signs were stable.  His wife 
did report that he seemed to have a change in his mental status, but by the time
he got to the Emergency Room, he was mostly at his baseline.  He is nonverbal 
and does not follow commands at the time of admission.  He is bedbound and fully
dependent on others for  ADLs.  His vital signs in the Emergency Room were 
temperature of 97.5, heart rate 126, blood pressure 124/83, respiratory rate 22 
to 26, it got up as high as the 30s.  His oxygen saturation was 88% on room air 
and came up to the low 90s on oxygen supplementation.  Lab studies showed WBC 
15,700 with a hemoglobin of 9.9, hematocrit 29.8.  He had a left shift on his 
differential.  Blood glucose was 165 with electrolytes within normal limits.  
BUN 50, creatinine 1.05, lactic acid 2.3.  He received some fluids.  His AST was
76, , alkaline phosphatase 262.  Creatinine kinase 915, troponin 0.03.  
.  Group A rapid strep was positive.  Blood cultures were drawn.  
Influenza per PCR negative for A and B.  Chest x-ray showed normal chest, one-
view.  Head CT showed (1) no acute intracranial hemorrhage or transcortical 
infarction.  MRI is more sensitive to the evaluation.  (2) Scattered chronic 
infarcts within the bilateral deep white matter and left temporal lobe.  (3)  
Nonspecific mineralization within the bilateral basilar and cerebellar dentate 
nuclei.   Abdomen and pelvis CT showed: (1) Left lung base pleural and 
subpleural air-space infiltrates concerning for pneumonia or mild infiltrates 
within the lingula and right lung base.  (2) Constipation, large fecal load 
within the rectum seen with fecal impaction.  (3)  No intraabdominal or pelvic 
organized fluid collection or abscess.  He was given Zosyn in the Emergency Room
as well as several liters of fluids.  His followup lactic acid was 2.1.  He was 
admitted to the hospital for bilateral pneumonia.



HOSPITAL COURSE:  He was continued on the Zosyn as well as vancomycin was added.
 The pneumonia guidelines were initiated with aggressive pulmonary hygiene 
including p.r.n. and scheduled albuterol.  He received a dose of Milk of 
Magnesia and his Depakote level was checked.  His tube feedings were resumed and
he also had sliding scale insulin per protocol.  COVID-19 test was sent off due 
to his high risk.  He was taken off Zosyn and continued on vancomycin plus 
cefepime and Levaquin.  He had a fecal disimpaction as well as Dulcolax 
suppositories.  He was started on MiraLAX and Milk of Magnesia.  He did have 
some low blood sugars and his Levemir was admitted to 10 units b.i.d.  Per his 
wife, he has returned to his baseline neuro status.  He still does not 
communicate other than he babbles, but is more awake and alert.  He had multiple
bowel movements.  His COVID-19 results came back negative.  His vital signs have
been stable and he will be discharged back to [facility] in stable 
condition.  



LABORATORY:  WBCs improved to 8,400 with hemoglobin and hematocrit stable at 8.3
and 24.5.  Electrolytes are basically within normal limits except his calcium is
slightly low at 7.9.  AST 50, , alkaline phosphatase 209.  Preliminary 
blood cultures show no growth after 3 days.  His followup chest x-ray shows no 
acute findings.



DISCHARGE PLAN:  The patient will be discharged back to [facility] 
in stable condition.  In addition to his routine medications, he will continue 
on cefdinir and Levaquin per G-tube.  I have increased his long-acting insulin 
to 10 units b.i.d. due to his low blood sugars here.  That can be adjusted once 
he is back at the facility and Dr. Taveras can adjust his insulin as needed.  I 
have also sent him home on some MiraLAX daily.  He is to followup with Dr. Taveras
within the next 1 to 2 weeks.  He is to return to the hospital or followup with 
Dr. Taveras for any problems or complications.



DISCHARGE MEDICATIONS:

1.  Zinc sulfate.

2.  Tramadol.

3.  Rapaflo.

4.  Seroquel.

5.  Fortamet. 

6.  Melatonin.

7.  Folic acid.

8.  Pepcid.

9.  Depakote.

10. Clonazepam.

11. Vitamin D.

12. Aspirin.

13. Vitamin C.

14. Albuterol inhaler.

15. Cefdinir.

16. Levemir.

17. Levaquin.

18. MiraLAX.



#[id number]

[facility]